# Patient Record
Sex: FEMALE | Race: WHITE | NOT HISPANIC OR LATINO | URBAN - NONMETROPOLITAN AREA
[De-identification: names, ages, dates, MRNs, and addresses within clinical notes are randomized per-mention and may not be internally consistent; named-entity substitution may affect disease eponyms.]

---

## 2017-03-23 ENCOUNTER — OFFICE VISIT (OUTPATIENT)
Dept: NEUROSURGERY | Facility: CLINIC | Age: 47
End: 2017-03-23

## 2017-03-23 VITALS
OXYGEN SATURATION: 97 % | WEIGHT: 211 LBS | DIASTOLIC BLOOD PRESSURE: 72 MMHG | TEMPERATURE: 98.2 F | HEIGHT: 64 IN | HEART RATE: 85 BPM | BODY MASS INDEX: 36.02 KG/M2 | SYSTOLIC BLOOD PRESSURE: 117 MMHG | RESPIRATION RATE: 16 BRPM

## 2017-03-23 DIAGNOSIS — M51.36 DEGENERATIVE DISC DISEASE, LUMBAR: Primary | ICD-10-CM

## 2017-03-23 DIAGNOSIS — M51.26 HNP (HERNIATED NUCLEUS PULPOSUS), LUMBAR: ICD-10-CM

## 2017-03-23 PROCEDURE — 99203 OFFICE O/P NEW LOW 30 MIN: CPT | Performed by: NEUROLOGICAL SURGERY

## 2017-03-23 RX ORDER — LISINOPRIL 5 MG/1
TABLET ORAL
Refills: 0 | COMMUNITY
Start: 2017-02-20

## 2017-03-23 RX ORDER — CANAGLIFLOZIN 300 MG/1
TABLET, FILM COATED ORAL
Refills: 0 | COMMUNITY
Start: 2017-03-16

## 2017-03-23 RX ORDER — LANSOPRAZOLE 30 MG/1
CAPSULE, DELAYED RELEASE ORAL
Refills: 0 | COMMUNITY
Start: 2017-02-17

## 2017-03-23 RX ORDER — GABAPENTIN 300 MG/1
CAPSULE ORAL
Refills: 0 | COMMUNITY
Start: 2017-02-20

## 2017-03-23 RX ORDER — LORATADINE 10 MG/1
TABLET ORAL
Refills: 0 | COMMUNITY
Start: 2017-02-17

## 2017-03-23 RX ORDER — NAPROXEN 500 MG/1
TABLET ORAL
Refills: 0 | COMMUNITY
Start: 2017-02-20

## 2017-03-23 RX ORDER — FUROSEMIDE 20 MG/1
TABLET ORAL
Refills: 0 | COMMUNITY
Start: 2017-02-20

## 2017-03-23 NOTE — PROGRESS NOTES
Tricia GOSS [place]  1970  [id number]      Chief Complaint   Patient presents with   • Back Pain       HISTORY OF PRESENT ILLNESS:  [This is a 46-year-old female who has a rather protracted tracked it history of non-radicular lumbar pain.  She also has pain in her legs related to a diabetic neuropathy.  Obviously she has had pain in the back which radiates to the anterior thigh the distribution of the lateral femoral cutaneous nerve.  She has not have weakness.  She has no symptoms of neurogenic claudication.  Lumbar MRIs been performed and she referred for neurosurgical consultation. ]    Past Medical History:   Diagnosis Date   • Arthritis    • Diabetes mellitus    • Hernia    • Low back pain        Past Surgical History:   Procedure Laterality Date   • APPENDECTOMY     •  SECTION     • GALLBLADDER SURGERY     • HERNIA REPAIR     • HYSTERECTOMY         Family History   Problem Relation Age of Onset   • Cancer Mother    • Diabetes Mother    • Heart disease Mother    • Cancer Father    • Diabetes Father    • COPD Father    • Heart disease Father    • Diabetes Sister    • Hypertension Sister    • Cancer Brother    • Lupus Brother    • Diabetes Maternal Grandmother    • Heart disease Maternal Grandmother    • Heart disease Maternal Grandfather        Social History     Social History   • Marital status:      Spouse name: N/A   • Number of children: N/A   • Years of education: N/A     Occupational History   • Not on file.     Social History Main Topics   • Smoking status: Never Smoker   • Smokeless tobacco: Never Used   • Alcohol use No   • Drug use: No   • Sexual activity: Defer     Other Topics Concern   • Not on file     Social History Narrative   • No narrative on file       Allergies   Allergen Reactions   • Penicillins          Current Outpatient Prescriptions:   •  furosemide (LASIX) 20 MG tablet, take 1 tablet by mouth once daily, Disp: , Rfl: 0  •  gabapentin (NEURONTIN)  "300 MG capsule, take 1 capsule by mouth three times a day, Disp: , Rfl: 0  •  INVOKANA 300 MG tablet, , Disp: , Rfl: 0  •  lansoprazole (PREVACID) 30 MG capsule, take 1 capsule by mouth once daily, Disp: , Rfl: 0  •  lisinopril (PRINIVIL,ZESTRIL) 5 MG tablet, take 1 tablet by mouth once daily, Disp: , Rfl: 0  •  loratadine (CLARITIN) 10 MG tablet, take 1 tablet by mouth once daily, Disp: , Rfl: 0  •  metFORMIN (GLUCOPHAGE) 500 MG tablet, take 1 tablet by mouth twice a day with food, Disp: , Rfl: 0  •  naproxen (NAPROSYN) 500 MG tablet, take 1 tablet by mouth every 12 hours if needed, Disp: , Rfl: 0    Review of Systems   Constitutional: Positive for fatigue.   HENT: Positive for sinus pressure.    Endocrine: Positive for polyuria.   Genitourinary: Positive for dyspareunia.   Musculoskeletal: Positive for arthralgias, back pain, joint swelling, neck pain and neck stiffness.       Neurological Examination:    Vitals:    03/23/17 1113   BP: 117/72   BP Location: Right arm   Patient Position: Sitting   Pulse: 85   Resp: 16   Temp: 98.2 °F (36.8 °C)   SpO2: 97%   Weight: 211 lb (95.7 kg)   Height: 64\" (162.6 cm)       Mental status/speech: The patient is alert and oriented.  Speech is clear without aphysia or dysarthria.  No overt cognitive deficits.    Cranial nerve examination:    Olfaction: Smell is intact.  Vision: Vision is intact without visual field abnormalities.  Funduscopic examination is normal.  No pupillary irregularity.  Ocular motor examination: The extraocular muscles are intact.  There is no diplopia.  The pupil is round and reactive to both light and accommodation.  There is no nystagmus.  Facial movement/sensation: There is no facial weakness.  Sensation is intact in the first, second, and third divisions of the trigeminal nerve.  The corneal reflex is intact.  Auditory: Hearing is intact to finger rub bilaterally.  Cranial nerves IX, X, XI, XII: Phonation is normal.  No dysphagia.  Tongue is protruded " in the midline without atrophy.  The gag reflex is intact.  Shoulder shrug is normal.    Musculoligamentous ligamentous examination: She has slightly obese with limitation of range of motion.  Straight leg raising, [place] and flip test are negative.  I'm unable to find any weakness or sensory loss.  He is generally areflexic in the upper and lower extremities.  There is no Babinski, Evelina or clonus.  Her gait is normal.    Medical Decision Making:     Diagnostic Data Set:  Lumbar MRI data set shows the presence of degenerative disc disease primarily at L4-L5 and L5-S1.  At L5-S1 she has a left deviation of the disc degeneration with mild compromise of the neuroforamen.      Assessment:  Symptomatic degenerative disc disease L4-L5 and L5-S1          Recommendations:  The symptoms that she has are related to disc degeneration.  Unfortunately she does not have symptomatology that would lend itself to surgical intervention.  That is not an option.  I believe the symptoms that she has in her thighs are related to meralgia paresthetica or lateral femoral cutaneous neuropathy.  I have suggested an appointment with Dr. Jose Vora for a facet block or WESLEY.  She will call me on as-needed basis.  I appreciate seeing her however neurosurgery is not going to be helpful at this time.        I greatly appreciate the opportunity to see and evaluate this individual.  If you have questions or concerns regarding issues that I may have overlooked please call me at any time: [phone].  Cholo Rosales M.D.  [facility]  [address] [place] [address].  [facility]  [id number]

## 2021-08-09 ENCOUNTER — APPOINTMENT (OUTPATIENT)
Dept: WOMENS IMAGING | Facility: HOSPITAL | Age: 51
End: 2021-08-09

## 2021-08-09 PROCEDURE — 77067 SCR MAMMO BI INCL CAD: CPT | Performed by: RADIOLOGY

## 2021-08-09 PROCEDURE — 77063 BREAST TOMOSYNTHESIS BI: CPT | Performed by: RADIOLOGY

## 2021-10-11 ENCOUNTER — APPOINTMENT (OUTPATIENT)
Dept: WOMENS IMAGING | Facility: HOSPITAL | Age: 51
End: 2021-10-11

## 2021-10-11 PROCEDURE — 77061 BREAST TOMOSYNTHESIS UNI: CPT | Performed by: RADIOLOGY

## 2021-10-11 PROCEDURE — 77065 DX MAMMO INCL CAD UNI: CPT | Performed by: RADIOLOGY

## 2023-09-25 ENCOUNTER — LAB REQUISITION (OUTPATIENT)
Dept: LAB | Facility: HOSPITAL | Age: 53
End: 2023-09-25
Payer: COMMERCIAL

## 2023-09-25 ENCOUNTER — APPOINTMENT (OUTPATIENT)
Dept: WOMENS IMAGING | Facility: HOSPITAL | Age: 53
End: 2023-09-25
Payer: COMMERCIAL

## 2023-09-25 DIAGNOSIS — N63.0 UNSPECIFIED LUMP IN UNSPECIFIED BREAST: ICD-10-CM

## 2023-09-25 PROCEDURE — 88305 TISSUE EXAM BY PATHOLOGIST: CPT | Performed by: NURSE PRACTITIONER

## 2023-09-25 PROCEDURE — 19083 BX BREAST 1ST LESION US IMAG: CPT | Performed by: RADIOLOGY

## 2023-09-25 PROCEDURE — A4648 IMPLANTABLE TISSUE MARKER: HCPCS | Performed by: RADIOLOGY

## 2023-09-26 LAB
DX PRELIMINARY: NORMAL
LAB AP CASE REPORT: NORMAL
LAB AP CLINICAL INFORMATION: NORMAL
PATH REPORT.FINAL DX SPEC: NORMAL
PATH REPORT.GROSS SPEC: NORMAL

## 2025-02-17 ENCOUNTER — HOSPITAL ENCOUNTER (OUTPATIENT)
Dept: CARDIOLOGY | Facility: HOSPITAL | Age: 55
Discharge: HOME | End: 2025-02-17
Payer: COMMERCIAL

## 2025-02-17 ENCOUNTER — OFFICE VISIT (OUTPATIENT)
Dept: CARDIOLOGY | Facility: CLINIC | Age: 55
End: 2025-02-17
Payer: COMMERCIAL

## 2025-02-17 ENCOUNTER — TELEPHONE (OUTPATIENT)
Dept: CARDIOLOGY | Facility: CLINIC | Age: 55
End: 2025-02-17
Payer: COMMERCIAL

## 2025-02-17 VITALS
OXYGEN SATURATION: 97 % | SYSTOLIC BLOOD PRESSURE: 118 MMHG | WEIGHT: 201.8 LBS | HEART RATE: 91 BPM | BODY MASS INDEX: 35.75 KG/M2 | DIASTOLIC BLOOD PRESSURE: 85 MMHG | HEIGHT: 63 IN

## 2025-02-17 DIAGNOSIS — Z01.810 PREOPERATIVE CARDIOVASCULAR EXAMINATION: ICD-10-CM

## 2025-02-17 DIAGNOSIS — R07.89 CHEST PRESSURE: Primary | ICD-10-CM

## 2025-02-17 DIAGNOSIS — I10 ESSENTIAL HYPERTENSION: ICD-10-CM

## 2025-02-17 DIAGNOSIS — R07.89 CHEST PRESSURE: ICD-10-CM

## 2025-02-17 DIAGNOSIS — R06.09 DOE (DYSPNEA ON EXERTION): ICD-10-CM

## 2025-02-17 LAB
BH CV STRESS DURATION MIN STAGE 1: 3
BH CV STRESS DURATION SEC STAGE 1: 0
BH CV STRESS GRADE STAGE 1: 10
BH CV STRESS METS STAGE 1: 5
BH CV STRESS PROTOCOL 1: NORMAL
BH CV STRESS RECOVERY BP: NORMAL MMHG
BH CV STRESS RECOVERY HR: 123 BPM
BH CV STRESS SPEED STAGE 1: 1.7
BH CV STRESS STAGE 1: 1
MAXIMAL PREDICTED HEART RATE: 167 BPM
PERCENT MAX PREDICTED HR: 101.2 %
STRESS BASELINE BP: NORMAL MMHG
STRESS BASELINE HR: 90 BPM
STRESS PERCENT HR: 119 %
STRESS POST ESTIMATED WORKLOAD: 7 METS
STRESS POST EXERCISE DUR MIN: 6 MIN
STRESS POST EXERCISE DUR SEC: 6 SEC
STRESS POST PEAK BP: NORMAL MMHG
STRESS POST PEAK HR: 169 BPM
STRESS TARGET HR: 142 BPM

## 2025-02-17 PROCEDURE — 99204 OFFICE O/P NEW MOD 45 MIN: CPT | Performed by: CLINICAL NURSE SPECIALIST

## 2025-02-17 PROCEDURE — 93306 TTE W/DOPPLER COMPLETE: CPT

## 2025-02-17 PROCEDURE — 93017 CV STRESS TEST TRACING ONLY: CPT

## 2025-02-17 PROCEDURE — 93018 CV STRESS TEST I&R ONLY: CPT | Performed by: INTERNAL MEDICINE

## 2025-02-17 RX ORDER — DICLOFENAC SODIUM 75 MG/1
75 TABLET, DELAYED RELEASE ORAL 2 TIMES DAILY
COMMUNITY

## 2025-02-17 RX ORDER — TIRZEPATIDE 5 MG/.5ML
1 INJECTION, SOLUTION SUBCUTANEOUS WEEKLY
COMMUNITY
Start: 2024-12-11

## 2025-02-17 RX ORDER — DAPAGLIFLOZIN 10 MG/1
TABLET, FILM COATED ORAL
COMMUNITY

## 2025-02-17 NOTE — PROGRESS NOTES
Subjective     Tricia Alegre is a 54 y.o. female who presents today for Establish Care (Wants cardiac clearance- is contacting surgeon to obtain request. ).    CHIEF COMPLIANT  Chief Complaint   Patient presents with    Establish Care     Wants cardiac clearance- is contacting surgeon to obtain request.        Active Problems:  1.  Diabetes mellitus type 2  2.  Essential hypertension  3.  Chest pressure  4.  Family history of premature CAD    HPI  The patient is a 54-year-old female that was referred to establish care for further evaluation to receive cardiac clearance for upcoming shoulder surgery.  The patient states over the past several months she has had intermittent pressure in the center of her chest.  She has also noticed some increased exertional dyspnea but does feel like this could be related to deconditioning and some weight gain.  The patient did have an EKG which showed normal sinus rhythm with incomplete RBBB and no acute ST or T wave abnormalities.  The patient does have a history of premature CAD.  She states her father had his first bypass surgery in his early 50s.  She states her mother also had CAD at an early age and A-fib.  The patient denies syncope, near syncope.  Most recent A1c is 5.3.  We do not have her most recent lipid panel but can request this from primary care.  Renal function stable.  She has had no prior cardiac testing.    PRIOR MEDS  Current Outpatient Medications on File Prior to Visit   Medication Sig Dispense Refill    dapagliflozin Propanediol (Farxiga) 10 MG tablet Take  by mouth.      diclofenac (VOLTAREN) 75 MG EC tablet Take 1 tablet by mouth 2 (Two) Times a Day.      furosemide (LASIX) 20 MG tablet take 1 tablet by mouth once daily  0    lisinopril (PRINIVIL,ZESTRIL) 10 MG tablet 1 tablet.  0    loratadine (CLARITIN) 10 MG tablet take 1 tablet by mouth once daily  0    Mounjaro 5 MG/0.5ML solution auto-injector Inject 1 syringe under the skin  "into the appropriate area as directed 1 (One) Time Per Week. (Patient taking differently: Inject 1 syringe under the skin into the appropriate area as directed 1 (One) Time Per Week. Pt thinks that she's going to stop this rx)      gabapentin (NEURONTIN) 300 MG capsule take 1 capsule by mouth three times a day  0    INVOKANA 300 MG tablet   0    lansoprazole (PREVACID) 30 MG capsule take 1 capsule by mouth once daily  0    metFORMIN (GLUCOPHAGE) 500 MG tablet take 1 tablet by mouth twice a day with food  0    naproxen (NAPROSYN) 500 MG tablet take 1 tablet by mouth every 12 hours if needed  0     No current facility-administered medications on file prior to visit.       ALLERGIES  Penicillins    HISTORY  Past Medical History:   Diagnosis Date    Abnormal kidney function     Arthritis     Diabetes mellitus     Hernia     Low back pain        Social History     Socioeconomic History    Marital status:    Tobacco Use    Smoking status: Never    Smokeless tobacco: Never   Substance and Sexual Activity    Alcohol use: No    Drug use: No    Sexual activity: Defer       Family History   Problem Relation Age of Onset    Cancer Mother     Diabetes Mother     Heart disease Mother     Cancer Father     Diabetes Father     COPD Father     Heart disease Father     Diabetes Sister     Hypertension Sister     Cancer Brother     Lupus Brother     Diabetes Maternal Grandmother     Heart disease Maternal Grandmother     Heart disease Maternal Grandfather        Review of Systems   Constitutional:  Positive for fatigue.   Respiratory:  Negative for chest tightness and shortness of breath.    Cardiovascular:  Positive for chest pain (Occasional \"twinge\" centered in chest \"here and there\"), palpitations (Occured \"a time or two\" no pattern in occurance that pt has noticed.) and leg swelling (BLE edema, usually goes down over night).        Pt had EKG w/ pre-op and was told she has RBBB.    Musculoskeletal:         Pt has torn " "rotator cuff and bicep tendon, is scheduled to have surgery this Thursday.    Neurological:  Positive for dizziness (w/ position change or bending over), weakness (Generalized), numbness (Since shoulder injury) and headaches. Negative for syncope and light-headedness.   Hematological:  Bruises/bleeds easily (Bruises).   Psychiatric/Behavioral:  Positive for sleep disturbance (Issues falling asleep and staying asleep- feels it's pain related).        Objective     VITALS: /85   Pulse 91   Ht 160 cm (63\") Comment: stated  Wt 91.5 kg (201 lb 12.8 oz)   SpO2 97%   BMI 35.75 kg/m²     LABS:   Lab Results (most recent)       None            IMAGING:   No Images in the past 120 days found..    EXAM:  Physical Exam  Constitutional:       Appearance: Normal appearance.   Eyes:      Pupils: Pupils are equal, round, and reactive to light.   Cardiovascular:      Rate and Rhythm: Normal rate and regular rhythm.      Pulses:           Carotid pulses are 2+ on the right side and 2+ on the left side.       Radial pulses are 2+ on the right side and 2+ on the left side.        Dorsalis pedis pulses are 2+ on the right side and 2+ on the left side.        Posterior tibial pulses are 2+ on the right side and 2+ on the left side.      Heart sounds: Normal heart sounds.   Pulmonary:      Effort: Pulmonary effort is normal.      Breath sounds: Normal breath sounds.   Abdominal:      General: Bowel sounds are normal.      Palpations: Abdomen is soft.   Musculoskeletal:      Right lower leg: No edema.      Left lower leg: No edema.   Skin:     General: Skin is warm and dry.      Capillary Refill: Capillary refill takes less than 2 seconds.   Neurological:      General: No focal deficit present.      Mental Status: She is alert and oriented to person, place, and time.   Psychiatric:         Mood and Affect: Mood normal.         Thought Content: Thought content normal.         Procedure   Procedures       Assessment & Plan    " Diagnosis Plan   1. Chest pressure  Treadmill Stress Test    Adult Transthoracic Echo Complete W/ Cont if Necessary Per Protocol      2. SYLVESTER (dyspnea on exertion)        3. Preoperative cardiovascular examination  Treadmill Stress Test    Adult Transthoracic Echo Complete W/ Cont if Necessary Per Protocol      4. Essential hypertension          Plan:  1.  Chest pressure: We will schedule patient for a treadmill stress test to further evaluate.  Will plan to see her back to review results.  2.  Dyspnea on exertion: The patient does attribute her symptoms to deconditioning and some weight gain.  She is having some intermittent chest pressure as well.  Will schedule her for an echocardiogram to evaluate LV function and structural anatomy.  3.  Preoperative cardiovascular examination: Would like to schedule the patient for a treadmill stress test prior to clearance for surgery.  Would also like to obtain an echocardiogram.  Her surgery is scheduled for this Thursday.  We were able to get her treadmill stress test scheduled for this afternoon.  We may not be able to obtain echocardiogram before surgery.  As long as stress test is normal we can proceed with clearance.  4.  Essential hypertension: Blood pressure is currently stable.  Continue lisinopril.  The patient was instructed to monitor BP at home and to notify our office if systolic BP is consistently greater than 130-135 systolic.  Goal BP is 130-135/70-80.  5.  Diabetes mellitus type 2: Most recent A1c under good control.  Return in about 2 months (around 4/17/2025).    Tricia Alegre  reports that she has never smoked. She has never used smokeless tobacco.     Class 2 Severe Obesity (BMI >=35 and <=39.9). Obesity-related health conditions include the following: obstructive sleep apnea, hypertension, coronary heart disease, and dyslipidemias.     MEDS ORDERED DURING VISIT:  No orders of the defined types were placed in this encounter.      DISCONTINUED MEDS  DURING VISIT:   There are no discontinued medications.       This document has been electronically signed by SAUL Joseph  February 17, 2025 12:25 EST    Dictated Utilizing Dragon Dictation: Part of this note may be an electronic transcription/translation of spoken language to printed text using the Dragon Dictation System

## 2025-02-17 NOTE — LETTER
February 20, 2025     Patient: Tricia Alegre   YOB: 1970   Date of Visit: 2/17/2025     To Whom It May Concern:    We build our practice on integrity and patient care. The highest compliment we can receive is the referral of friends, family and patients to our office. We want to take this time to thank you for considering our group as part of your care team.    The medical records for Tricia Alegre 1970 were reviewed for pre-operative clearance on 2/20/25. It has been determined that this patient has:  acceptable risk.     Tricia Alegre is currently on the following medications that will need to be held prior to surgery: {Medication Hold:53932}    This pre-operative evaluation has been completed based on patient reported medical conditions at the date of this letter, this evaluation may have considered in part results of additional testing, completion of an EKG and patient reported symptoms at the time of their visit.    Tricia Alegre's pre-operative clearance is good for thirty(30) days from the date of this letter with no reported changes in health, symptoms or diagnosis from the patient, their primary care provider or your office.    Your office has reported the patient will under go *** on *** with  ***. If this appointment is changed or moved outside of thirty(30) days from *** this pre-operative clearance is void.    If you have any further questions please give our office a call.    Thank you for your trust,      {[facility] Updated 2020:[id number]}

## 2025-02-17 NOTE — TELEPHONE ENCOUNTER
Received cardiac clearance request from DR. MARY KIRKLAND stating pt has ATHROSCOPIC CUFF REPAIR WITH BICEP TENODESIS scheduled for 02/20/2025 and is requiring a cardiac clearance. Placed cardiac clearance request in CHERISE'S inbox to review and address with provider.

## 2025-02-19 LAB
AV MEAN PRESS GRAD SYS DOP V1V2: 4.4 MMHG
AV VMAX SYS DOP: 153.4 CM/SEC
BH CV ECHO MEAS - ACS: 1.72 CM
BH CV ECHO MEAS - AO MAX PG: 9.4 MMHG
BH CV ECHO MEAS - AO ROOT DIAM: 2.6 CM
BH CV ECHO MEAS - AO V2 VTI: 27 CM
BH CV ECHO MEAS - EDV(CUBED): 25.2 ML
BH CV ECHO MEAS - EDV(MOD-SP4): 73.4 ML
BH CV ECHO MEAS - EF(MOD-SP4): 56.4 %
BH CV ECHO MEAS - ESV(CUBED): 6.3 ML
BH CV ECHO MEAS - ESV(MOD-SP4): 32 ML
BH CV ECHO MEAS - FS: 36.9 %
BH CV ECHO MEAS - IVS/LVPW: 0.98 CM
BH CV ECHO MEAS - IVSD: 1.2 CM
BH CV ECHO MEAS - LA DIMENSION: 3 CM
BH CV ECHO MEAS - LAT PEAK E' VEL: 7.7 CM/SEC
BH CV ECHO MEAS - LV DIASTOLIC VOL/BSA (35-75): 37.9 CM2
BH CV ECHO MEAS - LV MASS(C)D: 107.1 GRAMS
BH CV ECHO MEAS - LV SYSTOLIC VOL/BSA (12-30): 16.5 CM2
BH CV ECHO MEAS - LVIDD: 2.9 CM
BH CV ECHO MEAS - LVIDS: 1.85 CM
BH CV ECHO MEAS - LVPWD: 1.22 CM
BH CV ECHO MEAS - MED PEAK E' VEL: 6.9 CM/SEC
BH CV ECHO MEAS - MV A MAX VEL: 100.7 CM/SEC
BH CV ECHO MEAS - MV DEC TIME: 0.22 SEC
BH CV ECHO MEAS - MV E MAX VEL: 74.1 CM/SEC
BH CV ECHO MEAS - MV E/A: 0.74
BH CV ECHO MEAS - RVDD: 3.2 CM
BH CV ECHO MEAS - SV(MOD-SP4): 41.4 ML
BH CV ECHO MEAS - SVI(MOD-SP4): 21.4 ML/M2
BH CV ECHO MEASUREMENTS AVERAGE E/E' RATIO: 10.15
LEFT ATRIUM VOLUME INDEX: 12 ML/M2

## 2025-02-20 ENCOUNTER — TELEPHONE (OUTPATIENT)
Dept: CARDIOLOGY | Facility: CLINIC | Age: 55
End: 2025-02-20
Payer: COMMERCIAL

## 2025-02-20 NOTE — TELEPHONE ENCOUNTER
----- Message from Jennifer Lares sent at 2/19/2025  3:03 PM EST -----  Regarding: FW:  I have already called the patient the results of her echo and stress.  She can be cleared for surgery at acceptable risk.  No meds to hold.  They moved her surgery to Tuesday.  We just need to get this sent in ASAP.  ----- Message -----  From: Zeyad Lozano MD  Sent: 2/19/2025   2:27 PM EST  To: SAUL Buck

## 2025-02-20 NOTE — TELEPHONE ENCOUNTER
Patient is calling to check status of Cardiac Clearance. Clearance will be completed this morning. Patient notified.

## 2025-02-20 NOTE — TELEPHONE ENCOUNTER
Clearance letter completed and faxed. Called Ellen,  with Dr Carlos, to make aware patient was cleared, letter faxed and visible in Epic as well. She states patient's surgery was moved to next week.

## 2025-04-24 ENCOUNTER — OFFICE VISIT (OUTPATIENT)
Dept: CARDIOLOGY | Facility: CLINIC | Age: 55
End: 2025-04-24
Payer: COMMERCIAL

## 2025-04-24 VITALS
SYSTOLIC BLOOD PRESSURE: 110 MMHG | OXYGEN SATURATION: 97 % | DIASTOLIC BLOOD PRESSURE: 63 MMHG | HEIGHT: 63 IN | HEART RATE: 96 BPM | WEIGHT: 209.6 LBS | BODY MASS INDEX: 37.14 KG/M2

## 2025-04-24 DIAGNOSIS — R07.89 CHEST PRESSURE: Primary | ICD-10-CM

## 2025-04-24 DIAGNOSIS — I10 ESSENTIAL HYPERTENSION: ICD-10-CM

## 2025-04-24 DIAGNOSIS — E11.69 TYPE 2 DIABETES MELLITUS WITH OTHER SPECIFIED COMPLICATION, WITHOUT LONG-TERM CURRENT USE OF INSULIN: ICD-10-CM

## 2025-04-24 DIAGNOSIS — R06.09 DOE (DYSPNEA ON EXERTION): ICD-10-CM

## 2025-04-24 PROCEDURE — 99214 OFFICE O/P EST MOD 30 MIN: CPT | Performed by: CLINICAL NURSE SPECIALIST

## 2025-04-24 RX ORDER — OMEPRAZOLE 20 MG/1
20 TABLET, DELAYED RELEASE ORAL DAILY PRN
COMMUNITY

## 2025-04-24 RX ORDER — LANOLIN ALCOHOL/MO/W.PET/CERES
1000 CREAM (GRAM) TOPICAL DAILY
COMMUNITY

## 2025-04-24 RX ORDER — CHOLECALCIFEROL (VITAMIN D3) 50 MCG
TABLET ORAL DAILY
COMMUNITY

## 2025-04-24 NOTE — PROGRESS NOTES
Subjective     Tricia Alegre is a 54 y.o. female who presents today for Follow-up (Here for 2 mo. f/u), Chest Pain, and Shortness of Breath.    CHIEF COMPLIANT  Chief Complaint   Patient presents with    Follow-up     Here for 2 mo. f/u    Chest Pain    Shortness of Breath       Active Problems:    1.  Diabetes mellitus type 2  2.  Essential hypertension  3.  Treadmill stress test 2/17/2025: Baseline EKG demonstrated a sinus mechanism at 94 bpm with a horizontal axis, an RSR prime in V1, and anterolateral wall infarct age undetermined.  No diagnostic EKG changes suggestive of ischemia.  4.  Family history of premature CAD    HPI    The patient is a 54-year-old female that returns for follow-up.  The patient had a treadmill stress test that showed no ST changes agnostic of ischemia.  Echocardiogram showed an EF of 60 to 65% with evidence of grade 1 diastolic dysfunction.  No valvular abnormalities.  Since her last visit she has underwent shoulder surgery.  She denies chest pain, worsening dyspnea, syncope, near syncope or palpitations.  We did review her most recent labs.  Her A1c is 5.3.  Renal function stable.  She has not had a recent lipid panel.  Heart rate and blood pressure stable.      PRIOR MEDS  Current Outpatient Medications on File Prior to Visit   Medication Sig Dispense Refill    Cholecalciferol (Vitamin D3) 50 MCG (2000 UT) tablet Take  by mouth Daily.      diclofenac (VOLTAREN) 75 MG EC tablet Take 1 tablet by mouth 2 (Two) Times a Day.      furosemide (LASIX) 20 MG tablet take 1 tablet by mouth once daily  0    lisinopril (PRINIVIL,ZESTRIL) 5 MG tablet Take 1 tablet by mouth Daily.  0    loratadine (CLARITIN) 10 MG tablet take 1 tablet by mouth once daily  0    omeprazole OTC (PrilOSEC OTC) 20 MG EC tablet Take 1 tablet by mouth Daily As Needed.      Semaglutide (OZEMPIC, 1 MG/DOSE, SC) Inject  under the skin into the appropriate area as directed 1 (One) Time Per Week.      Unable to find 1 each  Daily. Previtalize probiotic      Unable to find 1 each Daily. Provitalize probiotic      Unable to find 1 each Daily. GlucoGold w/ berberine      vitamin B-12 (CYANOCOBALAMIN) 1000 MCG tablet Take 1 tablet by mouth Daily.      [DISCONTINUED] dapagliflozin Propanediol (Farxiga) 10 MG tablet Take  by mouth.      [DISCONTINUED] gabapentin (NEURONTIN) 300 MG capsule take 1 capsule by mouth three times a day  0    [DISCONTINUED] INVOKANA 300 MG tablet   0    [DISCONTINUED] lansoprazole (PREVACID) 30 MG capsule take 1 capsule by mouth once daily  0    [DISCONTINUED] metFORMIN (GLUCOPHAGE) 500 MG tablet take 1 tablet by mouth twice a day with food  0    [DISCONTINUED] Mounjaro 5 MG/0.5ML solution auto-injector Inject 1 syringe under the skin into the appropriate area as directed 1 (One) Time Per Week. (Patient taking differently: Inject 1 syringe under the skin into the appropriate area as directed 1 (One) Time Per Week. Pt thinks that she's going to stop this rx)      [DISCONTINUED] naproxen (NAPROSYN) 500 MG tablet take 1 tablet by mouth every 12 hours if needed  0     No current facility-administered medications on file prior to visit.       ALLERGIES  Penicillins    HISTORY  Past Medical History:   Diagnosis Date    Abnormal kidney function     Arthritis     Diabetes mellitus     Hernia     Low back pain        Social History     Socioeconomic History    Marital status:    Tobacco Use    Smoking status: Never    Smokeless tobacco: Never   Vaping Use    Vaping status: Never Used   Substance and Sexual Activity    Alcohol use: No    Drug use: No    Sexual activity: Defer       Family History   Problem Relation Age of Onset    Cancer Mother     Diabetes Mother     Heart disease Mother     Cancer Father     Diabetes Father     COPD Father     Heart disease Father     Diabetes Sister     Hypertension Sister     Cancer Brother     Lupus Brother     Diabetes Maternal Grandmother     Heart disease Maternal Grandmother   "   Heart disease Maternal Grandfather        Review of Systems   Constitutional:  Positive for fatigue.   HENT: Negative.     Eyes:  Positive for visual disturbance (contacts).   Respiratory: Negative.     Cardiovascular:  Positive for leg swelling. Negative for chest pain and palpitations.   Gastrointestinal: Negative.    Endocrine: Negative.    Genitourinary: Negative.    Musculoskeletal:  Positive for arthralgias and myalgias.   Allergic/Immunologic: Positive for environmental allergies.   Neurological: Negative.    Hematological:  Bruises/bleeds easily.   Psychiatric/Behavioral:  Positive for sleep disturbance.        Objective     VITALS: /63 (BP Location: Left arm, Patient Position: Sitting)   Pulse 96   Ht 160 cm (62.99\")   Wt 95.1 kg (209 lb 9.6 oz)   SpO2 97%   BMI 37.14 kg/m²     LABS:   Lab Results (most recent)       None            IMAGING:   No Images in the past 120 days found..    EXAM:  Physical Exam  Constitutional:       Appearance: Normal appearance.   Eyes:      Pupils: Pupils are equal, round, and reactive to light.   Cardiovascular:      Rate and Rhythm: Normal rate and regular rhythm.      Pulses:           Carotid pulses are 2+ on the right side and 2+ on the left side.       Radial pulses are 2+ on the right side and 2+ on the left side.        Dorsalis pedis pulses are 2+ on the right side and 2+ on the left side.        Posterior tibial pulses are 2+ on the right side and 2+ on the left side.      Heart sounds: Normal heart sounds.   Pulmonary:      Effort: Pulmonary effort is normal.      Breath sounds: Normal breath sounds.   Abdominal:      General: Bowel sounds are normal.      Palpations: Abdomen is soft.   Musculoskeletal:      Right lower leg: No edema.      Left lower leg: No edema.   Skin:     General: Skin is warm and dry.      Capillary Refill: Capillary refill takes less than 2 seconds.   Neurological:      General: No focal deficit present.      Mental Status: She " is alert and oriented to person, place, and time.   Psychiatric:         Mood and Affect: Mood normal.         Thought Content: Thought content normal.         Procedure   Procedures       Assessment & Plan    Diagnosis Plan   1. Chest pressure        2. SYLVESTER (dyspnea on exertion)        3. Essential hypertension        4. Type 2 diabetes mellitus with other specified complication, without long-term current use of insulin  Lipid Panel        Plan:  1.  Chest pressure: Treadmill stress test showed no EKG changes suggestive of ischemia.  The patient denies current chest pain symptoms.  She is vies notify our office if symptoms develop.    2.  Dyspnea on exertion: With evidence of grade 1 diastolic dysfunction on EKG.  She is on Lasix.  She denies worsening dyspnea.  She is vies notify our office if the symptoms develop.    3.  Essential hypertension: Blood pressure is currently stable.  Continue lisinopril.  She is on lisinopril more for kidney protection with diabetes rather than blood pressure management.  The patient was instructed to monitor BP at home and to notify our office if systolic BP is consistently greater than 130-135 systolic.  Goal BP is 130-135/70-80.  4.  Diabetes mellitus type 2: Most recent A1c under good control.  Return in about 1 year (around 4/24/2026).    Tricia GOSS Sidney  reports that she has never smoked. She has never used smokeless tobacco. I have educated her on the risk of diseases from using tobacco products such as cancer, COPD, and heart disease.                DO YOU VAPE? NO    Class 2 Severe Obesity (BMI >=35 and <=39.9). Obesity-related health conditions include the following: hypertension and diabetes mellitus. Obesity is  to be assessed at today's visit . BMI is  pcp addressing . We discussed portion control and increasing exercise.           MEDS ORDERED DURING VISIT:  No orders of the defined types were placed in this encounter.      DISCONTINUED MEDS DURING VISIT:    Medications Discontinued During This Encounter   Medication Reason    Mounjaro 5 MG/0.5ML solution auto-injector Patient Reported Not Taking    naproxen (NAPROSYN) 500 MG tablet Patient Reported Not Taking    lansoprazole (PREVACID) 30 MG capsule Patient Reported Not Taking    metFORMIN (GLUCOPHAGE) 500 MG tablet Patient Reported Not Taking    INVOKANA 300 MG tablet Patient Reported Not Taking    gabapentin (NEURONTIN) 300 MG capsule Patient Reported Not Taking    dapagliflozin Propanediol (Farxiga) 10 MG tablet Patient Reported Not Taking          This document has been electronically signed by SAUL Joseph  April 24, 2025 15:40 EDT    Dictated Utilizing Dragon Dictation: Part of this note may be an electronic transcription/translation of spoken language to printed text using the Dragon Dictation System